# Patient Record
Sex: FEMALE | Race: WHITE | NOT HISPANIC OR LATINO | URBAN - METROPOLITAN AREA
[De-identification: names, ages, dates, MRNs, and addresses within clinical notes are randomized per-mention and may not be internally consistent; named-entity substitution may affect disease eponyms.]

---

## 2022-09-13 ENCOUNTER — OFFICE VISIT (OUTPATIENT)
Dept: URBAN - METROPOLITAN AREA CLINIC 98 | Facility: CLINIC | Age: 67
End: 2022-09-13
Payer: MEDICARE

## 2022-09-13 ENCOUNTER — OFFICE VISIT (OUTPATIENT)
Dept: URBAN - METROPOLITAN AREA CLINIC 98 | Facility: CLINIC | Age: 67
End: 2022-09-13

## 2022-09-13 ENCOUNTER — WEB ENCOUNTER (OUTPATIENT)
Dept: URBAN - METROPOLITAN AREA CLINIC 98 | Facility: CLINIC | Age: 67
End: 2022-09-13

## 2022-09-13 VITALS
DIASTOLIC BLOOD PRESSURE: 83 MMHG | WEIGHT: 216 LBS | HEIGHT: 64 IN | HEART RATE: 74 BPM | SYSTOLIC BLOOD PRESSURE: 149 MMHG | TEMPERATURE: 97.1 F | BODY MASS INDEX: 36.88 KG/M2

## 2022-09-13 DIAGNOSIS — Z12.11 SCREEN FOR COLON CANCER: ICD-10-CM

## 2022-09-13 PROCEDURE — 99203 OFFICE O/P NEW LOW 30 MIN: CPT | Performed by: INTERNAL MEDICINE

## 2022-09-13 NOTE — HPI-TODAY'S VISIT:
Colonoscopy was done January 2016.  Hyperplastic polyp.  Otherwise normal. had ablation for a fib on ASA and Eliquis had cardiac stents in past HBP meds control BP exercises regularly

## 2022-09-29 ENCOUNTER — TELEPHONE ENCOUNTER (OUTPATIENT)
Dept: URBAN - METROPOLITAN AREA CLINIC 98 | Facility: CLINIC | Age: 67
End: 2022-09-29

## 2022-10-03 ENCOUNTER — TELEPHONE ENCOUNTER (OUTPATIENT)
Dept: URBAN - METROPOLITAN AREA CLINIC 98 | Facility: CLINIC | Age: 67
End: 2022-10-03

## 2022-11-15 ENCOUNTER — WEB ENCOUNTER (OUTPATIENT)
Dept: URBAN - METROPOLITAN AREA CLINIC 98 | Facility: CLINIC | Age: 67
End: 2022-11-15

## 2022-11-17 ENCOUNTER — OFFICE VISIT (OUTPATIENT)
Dept: URBAN - METROPOLITAN AREA SURGERY CENTER 18 | Facility: SURGERY CENTER | Age: 67
End: 2022-11-17

## 2022-11-17 ENCOUNTER — CLAIMS CREATED FROM THE CLAIM WINDOW (OUTPATIENT)
Dept: URBAN - METROPOLITAN AREA CLINIC 4 | Facility: CLINIC | Age: 67
End: 2022-11-17
Payer: MEDICARE

## 2022-11-17 ENCOUNTER — CLAIMS CREATED FROM THE CLAIM WINDOW (OUTPATIENT)
Dept: URBAN - METROPOLITAN AREA SURGERY CENTER 18 | Facility: SURGERY CENTER | Age: 67
End: 2022-11-17
Payer: MEDICARE

## 2022-11-17 DIAGNOSIS — Z12.11 COLON CANCER SCREENING: ICD-10-CM

## 2022-11-17 DIAGNOSIS — D17.5 BENIGN LIPOMATOUS NEOPLASM OF INTRA-ABDOMINAL ORGANS: ICD-10-CM

## 2022-11-17 DIAGNOSIS — K63.89 OTHER SPECIFIED DISEASES OF INTESTINE: ICD-10-CM

## 2022-11-17 DIAGNOSIS — K63.89 BACTERIAL OVERGROWTH SYNDROME: ICD-10-CM

## 2022-11-17 PROCEDURE — 45380 COLONOSCOPY AND BIOPSY: CPT | Performed by: INTERNAL MEDICINE

## 2022-11-17 PROCEDURE — G8907 PT DOC NO EVENTS ON DISCHARG: HCPCS | Performed by: INTERNAL MEDICINE

## 2022-11-17 PROCEDURE — 88305 TISSUE EXAM BY PATHOLOGIST: CPT | Performed by: PATHOLOGY
